# Patient Record
Sex: MALE | URBAN - NONMETROPOLITAN AREA
[De-identification: names, ages, dates, MRNs, and addresses within clinical notes are randomized per-mention and may not be internally consistent; named-entity substitution may affect disease eponyms.]

---

## 2017-10-02 ENCOUNTER — APPOINTMENT (RX ONLY)
Dept: URBAN - NONMETROPOLITAN AREA CLINIC 1 | Facility: CLINIC | Age: 57
Setting detail: DERMATOLOGY
End: 2017-10-02

## 2017-10-02 DIAGNOSIS — L81.4 OTHER MELANIN HYPERPIGMENTATION: ICD-10-CM

## 2017-10-02 DIAGNOSIS — L738 OTHER SPECIFIED DISEASES OF HAIR AND HAIR FOLLICLES: ICD-10-CM

## 2017-10-02 DIAGNOSIS — L73.9 FOLLICULAR DISORDER, UNSPECIFIED: ICD-10-CM

## 2017-10-02 DIAGNOSIS — L91.8 OTHER HYPERTROPHIC DISORDERS OF THE SKIN: ICD-10-CM

## 2017-10-02 DIAGNOSIS — D18.0 HEMANGIOMA: ICD-10-CM

## 2017-10-02 DIAGNOSIS — L85.3 XEROSIS CUTIS: ICD-10-CM

## 2017-10-02 DIAGNOSIS — L82.1 OTHER SEBORRHEIC KERATOSIS: ICD-10-CM

## 2017-10-02 DIAGNOSIS — L663 OTHER SPECIFIED DISEASES OF HAIR AND HAIR FOLLICLES: ICD-10-CM

## 2017-10-02 PROBLEM — L55.1 SUNBURN OF SECOND DEGREE: Status: ACTIVE | Noted: 2017-10-02

## 2017-10-02 PROBLEM — K21.9 GASTRO-ESOPHAGEAL REFLUX DISEASE WITHOUT ESOPHAGITIS: Status: ACTIVE | Noted: 2017-10-02

## 2017-10-02 PROBLEM — D18.01 HEMANGIOMA OF SKIN AND SUBCUTANEOUS TISSUE: Status: ACTIVE | Noted: 2017-10-02

## 2017-10-02 PROBLEM — K75.9 INFLAMMATORY LIVER DISEASE, UNSPECIFIED: Status: ACTIVE | Noted: 2017-10-02

## 2017-10-02 PROBLEM — L70.0 ACNE VULGARIS: Status: ACTIVE | Noted: 2017-10-02

## 2017-10-02 PROBLEM — J45.909 UNSPECIFIED ASTHMA, UNCOMPLICATED: Status: ACTIVE | Noted: 2017-10-02

## 2017-10-02 PROBLEM — Z85.828 PERSONAL HISTORY OF OTHER MALIGNANT NEOPLASM OF SKIN: Status: ACTIVE | Noted: 2017-10-02

## 2017-10-02 PROBLEM — L02.821 FURUNCLE OF HEAD [ANY PART, EXCEPT FACE]: Status: ACTIVE | Noted: 2017-10-02

## 2017-10-02 PROBLEM — F41.9 ANXIETY DISORDER, UNSPECIFIED: Status: ACTIVE | Noted: 2017-10-02

## 2017-10-02 PROCEDURE — ? COUNSELING

## 2017-10-02 PROCEDURE — ? PRESCRIPTION

## 2017-10-02 PROCEDURE — ? TREATMENT REGIMEN

## 2017-10-02 PROCEDURE — 99203 OFFICE O/P NEW LOW 30 MIN: CPT

## 2017-10-02 PROCEDURE — ? BENIGN DESTRUCTION COSMETIC MULTI

## 2017-10-02 RX ORDER — CLINDAMYCIN PHOSPHATE 10 MG/ML
SOLUTION TOPICAL
Qty: 1 | Refills: 3 | Status: ERX | COMMUNITY
Start: 2017-10-02

## 2017-10-02 RX ADMIN — CLINDAMYCIN PHOSPHATE: 10 SOLUTION TOPICAL at 21:21

## 2017-10-02 ASSESSMENT — LOCATION DETAILED DESCRIPTION DERM
LOCATION DETAILED: LEFT AXILLARY VAULT
LOCATION DETAILED: LEFT LATERAL ABDOMEN
LOCATION DETAILED: LEFT MEDIAL INFERIOR CHEST
LOCATION DETAILED: RIGHT AXILLARY VAULT
LOCATION DETAILED: RIGHT INFERIOR OCCIPITAL SCALP
LOCATION DETAILED: RIGHT ANTERIOR PROXIMAL THIGH

## 2017-10-02 ASSESSMENT — LOCATION SIMPLE DESCRIPTION DERM
LOCATION SIMPLE: ABDOMEN
LOCATION SIMPLE: LEFT AXILLARY VAULT
LOCATION SIMPLE: RIGHT THIGH
LOCATION SIMPLE: RIGHT AXILLARY VAULT
LOCATION SIMPLE: POSTERIOR SCALP
LOCATION SIMPLE: CHEST

## 2017-10-02 ASSESSMENT — LOCATION ZONE DERM
LOCATION ZONE: LEG
LOCATION ZONE: SCALP
LOCATION ZONE: AXILLAE
LOCATION ZONE: TRUNK

## 2017-10-02 NOTE — PROCEDURE: BENIGN DESTRUCTION COSMETIC MULTI
Detail Level: Generalized
Price (Use Numbers Only, No Special Characters Or $): 150
Total Number Of Lesions Treated: 10
Post-Care Instructions: I reviewed with the patient in detail post-care instructions. Patient is to wear sunprotection, and avoid picking at any of the treated lesions. Pt may apply Vaseline to crusted or scabbing areas.
Anesthesia Volume In Cc: 0
Consent: The patient's consent was obtained including but not limited to risks of crusting, scabbing, blistering, scarring, darker or lighter pigmentary change, recurrence, incomplete removal and infection.

## 2018-03-07 ENCOUNTER — RESOLUTE PROFESSIONAL BILLING HOSPITAL PROF FEE (OUTPATIENT)
Dept: HOSPITALIST | Facility: MEDICAL CENTER | Age: 58
End: 2018-03-07
Payer: COMMERCIAL

## 2018-03-07 ENCOUNTER — APPOINTMENT (OUTPATIENT)
Dept: RADIOLOGY | Facility: MEDICAL CENTER | Age: 58
End: 2018-03-07
Attending: EMERGENCY MEDICINE
Payer: COMMERCIAL

## 2018-03-07 ENCOUNTER — HOSPITAL ENCOUNTER (OUTPATIENT)
Facility: MEDICAL CENTER | Age: 58
End: 2018-03-08
Attending: EMERGENCY MEDICINE | Admitting: HOSPITALIST
Payer: COMMERCIAL

## 2018-03-07 DIAGNOSIS — R07.2 PRECORDIAL PAIN: ICD-10-CM

## 2018-03-07 DIAGNOSIS — I10 ESSENTIAL HYPERTENSION: ICD-10-CM

## 2018-03-07 PROBLEM — R07.9 PAIN IN THE CHEST: Status: ACTIVE | Noted: 2018-03-07

## 2018-03-07 PROBLEM — G47.33 OSA ON CPAP: Status: ACTIVE | Noted: 2018-03-07

## 2018-03-07 LAB
ALBUMIN SERPL BCP-MCNC: 4.3 G/DL (ref 3.2–4.9)
ALBUMIN/GLOB SERPL: 2.3 G/DL
ALP SERPL-CCNC: 54 U/L (ref 30–99)
ALT SERPL-CCNC: 52 U/L (ref 2–50)
ANION GAP SERPL CALC-SCNC: 11 MMOL/L (ref 0–11.9)
AST SERPL-CCNC: 24 U/L (ref 12–45)
BILIRUB SERPL-MCNC: 1.4 MG/DL (ref 0.1–1.5)
BNP SERPL-MCNC: <2 PG/ML (ref 0–100)
BUN SERPL-MCNC: 10 MG/DL (ref 8–22)
CALCIUM SERPL-MCNC: 9.4 MG/DL (ref 8.5–10.5)
CHLORIDE SERPL-SCNC: 108 MMOL/L (ref 96–112)
CO2 SERPL-SCNC: 19 MMOL/L (ref 20–33)
CREAT SERPL-MCNC: 0.67 MG/DL (ref 0.5–1.4)
EKG IMPRESSION: NORMAL
EKG IMPRESSION: NORMAL
GLOBULIN SER CALC-MCNC: 1.9 G/DL (ref 1.9–3.5)
GLUCOSE SERPL-MCNC: 92 MG/DL (ref 65–99)
LIPASE SERPL-CCNC: 49 U/L (ref 11–82)
POTASSIUM SERPL-SCNC: 3.9 MMOL/L (ref 3.6–5.5)
PROT SERPL-MCNC: 6.2 G/DL (ref 6–8.2)
SODIUM SERPL-SCNC: 138 MMOL/L (ref 135–145)
TROPONIN I SERPL-MCNC: <0.01 NG/ML (ref 0–0.04)
TROPONIN I SERPL-MCNC: <0.01 NG/ML (ref 0–0.04)

## 2018-03-07 PROCEDURE — 96372 THER/PROPH/DIAG INJ SC/IM: CPT

## 2018-03-07 PROCEDURE — 700102 HCHG RX REV CODE 250 W/ 637 OVERRIDE(OP): Performed by: HOSPITALIST

## 2018-03-07 PROCEDURE — 700102 HCHG RX REV CODE 250 W/ 637 OVERRIDE(OP): Performed by: EMERGENCY MEDICINE

## 2018-03-07 PROCEDURE — 83690 ASSAY OF LIPASE: CPT

## 2018-03-07 PROCEDURE — 93005 ELECTROCARDIOGRAM TRACING: CPT

## 2018-03-07 PROCEDURE — 83880 ASSAY OF NATRIURETIC PEPTIDE: CPT

## 2018-03-07 PROCEDURE — 700111 HCHG RX REV CODE 636 W/ 250 OVERRIDE (IP): Performed by: HOSPITALIST

## 2018-03-07 PROCEDURE — A9270 NON-COVERED ITEM OR SERVICE: HCPCS | Performed by: HOSPITALIST

## 2018-03-07 PROCEDURE — 93005 ELECTROCARDIOGRAM TRACING: CPT | Performed by: EMERGENCY MEDICINE

## 2018-03-07 PROCEDURE — G0378 HOSPITAL OBSERVATION PER HR: HCPCS

## 2018-03-07 PROCEDURE — 84484 ASSAY OF TROPONIN QUANT: CPT

## 2018-03-07 PROCEDURE — 71045 X-RAY EXAM CHEST 1 VIEW: CPT

## 2018-03-07 PROCEDURE — 36415 COLL VENOUS BLD VENIPUNCTURE: CPT

## 2018-03-07 PROCEDURE — 80053 COMPREHEN METABOLIC PANEL: CPT

## 2018-03-07 PROCEDURE — 93005 ELECTROCARDIOGRAM TRACING: CPT | Performed by: HOSPITALIST

## 2018-03-07 PROCEDURE — 99220 PR INITIAL OBSERVATION CARE,LEVL III: CPT | Performed by: HOSPITALIST

## 2018-03-07 PROCEDURE — 93010 ELECTROCARDIOGRAM REPORT: CPT | Performed by: INTERNAL MEDICINE

## 2018-03-07 PROCEDURE — A9270 NON-COVERED ITEM OR SERVICE: HCPCS | Performed by: EMERGENCY MEDICINE

## 2018-03-07 PROCEDURE — 84443 ASSAY THYROID STIM HORMONE: CPT

## 2018-03-07 PROCEDURE — 99285 EMERGENCY DEPT VISIT HI MDM: CPT

## 2018-03-07 RX ORDER — ASPIRIN 81 MG/1
324 TABLET, CHEWABLE ORAL DAILY
Status: DISCONTINUED | OUTPATIENT
Start: 2018-03-07 | End: 2018-03-08 | Stop reason: HOSPADM

## 2018-03-07 RX ORDER — BISACODYL 10 MG
10 SUPPOSITORY, RECTAL RECTAL
Status: DISCONTINUED | OUTPATIENT
Start: 2018-03-07 | End: 2018-03-08 | Stop reason: HOSPADM

## 2018-03-07 RX ORDER — HEPARIN SODIUM 5000 [USP'U]/ML
5000 INJECTION, SOLUTION INTRAVENOUS; SUBCUTANEOUS EVERY 8 HOURS
Status: DISCONTINUED | OUTPATIENT
Start: 2018-03-07 | End: 2018-03-08 | Stop reason: HOSPADM

## 2018-03-07 RX ORDER — LISINOPRIL 10 MG/1
5 TABLET ORAL
Status: DISCONTINUED | OUTPATIENT
Start: 2018-03-08 | End: 2018-03-08 | Stop reason: HOSPADM

## 2018-03-07 RX ORDER — MECOBALAMIN 5000 MCG
15 TABLET,DISINTEGRATING ORAL DAILY
Status: ON HOLD | COMMUNITY
End: 2018-03-08

## 2018-03-07 RX ORDER — ACETAMINOPHEN 325 MG/1
650 TABLET ORAL EVERY 6 HOURS PRN
Status: DISCONTINUED | OUTPATIENT
Start: 2018-03-07 | End: 2018-03-08 | Stop reason: HOSPADM

## 2018-03-07 RX ORDER — ASPIRIN 300 MG/1
300 SUPPOSITORY RECTAL DAILY
Status: DISCONTINUED | OUTPATIENT
Start: 2018-03-07 | End: 2018-03-08 | Stop reason: HOSPADM

## 2018-03-07 RX ORDER — PROMETHAZINE HYDROCHLORIDE 25 MG/1
12.5-25 TABLET ORAL EVERY 4 HOURS PRN
Status: DISCONTINUED | OUTPATIENT
Start: 2018-03-07 | End: 2018-03-08 | Stop reason: HOSPADM

## 2018-03-07 RX ORDER — LISINOPRIL 5 MG/1
5 TABLET ORAL DAILY
COMMUNITY

## 2018-03-07 RX ORDER — POLYETHYLENE GLYCOL 3350 17 G/17G
1 POWDER, FOR SOLUTION ORAL
Status: DISCONTINUED | OUTPATIENT
Start: 2018-03-07 | End: 2018-03-08 | Stop reason: HOSPADM

## 2018-03-07 RX ORDER — ASPIRIN 325 MG
325 TABLET ORAL DAILY
Status: DISCONTINUED | OUTPATIENT
Start: 2018-03-07 | End: 2018-03-08 | Stop reason: HOSPADM

## 2018-03-07 RX ORDER — HYDRALAZINE HYDROCHLORIDE 25 MG/1
25 TABLET, FILM COATED ORAL EVERY 6 HOURS PRN
Status: DISCONTINUED | OUTPATIENT
Start: 2018-03-07 | End: 2018-03-08 | Stop reason: HOSPADM

## 2018-03-07 RX ORDER — PROMETHAZINE HYDROCHLORIDE 12.5 MG/1
12.5-25 SUPPOSITORY RECTAL EVERY 4 HOURS PRN
Status: DISCONTINUED | OUTPATIENT
Start: 2018-03-07 | End: 2018-03-08 | Stop reason: HOSPADM

## 2018-03-07 RX ORDER — ONDANSETRON 4 MG/1
4 TABLET, ORALLY DISINTEGRATING ORAL EVERY 4 HOURS PRN
Status: DISCONTINUED | OUTPATIENT
Start: 2018-03-07 | End: 2018-03-08 | Stop reason: HOSPADM

## 2018-03-07 RX ORDER — ONDANSETRON 2 MG/ML
4 INJECTION INTRAMUSCULAR; INTRAVENOUS EVERY 4 HOURS PRN
Status: DISCONTINUED | OUTPATIENT
Start: 2018-03-07 | End: 2018-03-08 | Stop reason: HOSPADM

## 2018-03-07 RX ORDER — AMOXICILLIN 250 MG
2 CAPSULE ORAL 2 TIMES DAILY
Status: DISCONTINUED | OUTPATIENT
Start: 2018-03-07 | End: 2018-03-08 | Stop reason: HOSPADM

## 2018-03-07 RX ADMIN — ASPIRIN 324 MG: 81 TABLET, CHEWABLE ORAL at 20:22

## 2018-03-07 RX ADMIN — HEPARIN SODIUM 5000 UNITS: 5000 INJECTION, SOLUTION INTRAVENOUS; SUBCUTANEOUS at 21:42

## 2018-03-07 RX ADMIN — METOPROLOL TARTRATE 25 MG: 25 TABLET, FILM COATED ORAL at 19:07

## 2018-03-07 ASSESSMENT — ENCOUNTER SYMPTOMS
DIZZINESS: 0
FOCAL WEAKNESS: 0
EYE DISCHARGE: 0
DEPRESSION: 0
SPEECH CHANGE: 0
CLAUDICATION: 0
WHEEZING: 0
HEMOPTYSIS: 0
HEADACHES: 0
SHORTNESS OF BREATH: 0
EYE PAIN: 0
NAUSEA: 0
CONSTIPATION: 0
PALPITATIONS: 0
DIAPHORESIS: 0
WEAKNESS: 0
CHILLS: 0
ABDOMINAL PAIN: 0
NECK PAIN: 0
DIARRHEA: 0
VOMITING: 0
FEVER: 0
MYALGIAS: 0
SPUTUM PRODUCTION: 0
BACK PAIN: 0
BRUISES/BLEEDS EASILY: 0
SENSORY CHANGE: 0
LOSS OF CONSCIOUSNESS: 0
SORE THROAT: 0
COUGH: 0

## 2018-03-07 ASSESSMENT — PAIN SCALES - GENERAL
PAINLEVEL_OUTOF10: 3
PAINLEVEL_OUTOF10: 3
PAINLEVEL_OUTOF10: 0

## 2018-03-07 ASSESSMENT — LIFESTYLE VARIABLES
TOTAL SCORE: 0
EVER FELT BAD OR GUILTY ABOUT YOUR DRINKING: NO
EVER_SMOKED: NEVER
TOTAL SCORE: 0
AVERAGE NUMBER OF DAYS PER WEEK YOU HAVE A DRINK CONTAINING ALCOHOL: 1
TOTAL SCORE: 0
HAVE PEOPLE ANNOYED YOU BY CRITICIZING YOUR DRINKING: NO
SUBSTANCE_ABUSE: 0
CONSUMPTION TOTAL: NEGATIVE
HAVE YOU EVER FELT YOU SHOULD CUT DOWN ON YOUR DRINKING: NO
HOW MANY TIMES IN THE PAST YEAR HAVE YOU HAD 5 OR MORE DRINKS IN A DAY: 0
ALCOHOL_USE: YES
ON A TYPICAL DAY WHEN YOU DRINK ALCOHOL HOW MANY DRINKS DO YOU HAVE: 1
EVER HAD A DRINK FIRST THING IN THE MORNING TO STEADY YOUR NERVES TO GET RID OF A HANGOVER: NO

## 2018-03-07 ASSESSMENT — PATIENT HEALTH QUESTIONNAIRE - PHQ9
SUM OF ALL RESPONSES TO PHQ QUESTIONS 1-9: 0
1. LITTLE INTEREST OR PLEASURE IN DOING THINGS: NOT AT ALL
SUM OF ALL RESPONSES TO PHQ9 QUESTIONS 1 AND 2: 0
2. FEELING DOWN, DEPRESSED, IRRITABLE, OR HOPELESS: NOT AT ALL

## 2018-03-07 NOTE — ED TRIAGE NOTES
56 y/o male ambulatory to triage with c/o chest pain and left shoulder pain. Pt states he was seen at [facility] last week for similar symptoms and is scheduled for a stress test on 3/21/18. Pt states the pain is intermittent and dull.

## 2018-03-08 ENCOUNTER — APPOINTMENT (OUTPATIENT)
Dept: RADIOLOGY | Facility: MEDICAL CENTER | Age: 58
End: 2018-03-08
Attending: HOSPITALIST
Payer: COMMERCIAL

## 2018-03-08 VITALS
RESPIRATION RATE: 18 BRPM | HEART RATE: 100 BPM | TEMPERATURE: 98.4 F | OXYGEN SATURATION: 92 % | BODY MASS INDEX: 31.57 KG/M2 | SYSTOLIC BLOOD PRESSURE: 108 MMHG | DIASTOLIC BLOOD PRESSURE: 81 MMHG | HEIGHT: 68 IN | WEIGHT: 208.34 LBS

## 2018-03-08 PROBLEM — K21.9 GERD (GASTROESOPHAGEAL REFLUX DISEASE): Status: ACTIVE | Noted: 2018-03-08

## 2018-03-08 PROBLEM — R07.9 PAIN IN THE CHEST: Status: RESOLVED | Noted: 2018-03-07 | Resolved: 2018-03-08

## 2018-03-08 LAB
ANION GAP SERPL CALC-SCNC: 8 MMOL/L (ref 0–11.9)
BASOPHILS # BLD AUTO: 1.3 % (ref 0–1.8)
BASOPHILS # BLD: 0.11 K/UL (ref 0–0.12)
BUN SERPL-MCNC: 12 MG/DL (ref 8–22)
CALCIUM SERPL-MCNC: 9 MG/DL (ref 8.5–10.5)
CHLORIDE SERPL-SCNC: 107 MMOL/L (ref 96–112)
CHOLEST SERPL-MCNC: 159 MG/DL (ref 100–199)
CO2 SERPL-SCNC: 24 MMOL/L (ref 20–33)
CREAT SERPL-MCNC: 0.84 MG/DL (ref 0.5–1.4)
DEPRECATED D DIMER PPP IA-ACNC: <200 NG/ML(D-DU)
EKG IMPRESSION: NORMAL
EOSINOPHIL # BLD AUTO: 0.69 K/UL (ref 0–0.51)
EOSINOPHIL NFR BLD: 8.4 % (ref 0–6.9)
ERYTHROCYTE [DISTWIDTH] IN BLOOD BY AUTOMATED COUNT: 40.6 FL (ref 35.9–50)
GLUCOSE SERPL-MCNC: 120 MG/DL (ref 65–99)
HCT VFR BLD AUTO: 49.1 % (ref 42–52)
HDLC SERPL-MCNC: 42 MG/DL
HGB BLD-MCNC: 15.9 G/DL (ref 14–18)
IMM GRANULOCYTES # BLD AUTO: 0.02 K/UL (ref 0–0.11)
IMM GRANULOCYTES NFR BLD AUTO: 0.2 % (ref 0–0.9)
LDLC SERPL CALC-MCNC: 73 MG/DL
LYMPHOCYTES # BLD AUTO: 3.14 K/UL (ref 1–4.8)
LYMPHOCYTES NFR BLD: 38.1 % (ref 22–41)
MCH RBC QN AUTO: 28 PG (ref 27–33)
MCHC RBC AUTO-ENTMCNC: 32.4 G/DL (ref 33.7–35.3)
MCV RBC AUTO: 86.4 FL (ref 81.4–97.8)
MONOCYTES # BLD AUTO: 0.68 K/UL (ref 0–0.85)
MONOCYTES NFR BLD AUTO: 8.2 % (ref 0–13.4)
NEUTROPHILS # BLD AUTO: 3.61 K/UL (ref 1.82–7.42)
NEUTROPHILS NFR BLD: 43.8 % (ref 44–72)
NRBC # BLD AUTO: 0 K/UL
NRBC BLD-RTO: 0 /100 WBC
PLATELET # BLD AUTO: 234 K/UL (ref 164–446)
PMV BLD AUTO: 9.9 FL (ref 9–12.9)
POTASSIUM SERPL-SCNC: 3.5 MMOL/L (ref 3.6–5.5)
RBC # BLD AUTO: 5.68 M/UL (ref 4.7–6.1)
SODIUM SERPL-SCNC: 139 MMOL/L (ref 135–145)
TRIGL SERPL-MCNC: 218 MG/DL (ref 0–149)
TROPONIN I SERPL-MCNC: <0.01 NG/ML (ref 0–0.04)
TSH SERPL DL<=0.005 MIU/L-ACNC: 0.68 UIU/ML (ref 0.38–5.33)
WBC # BLD AUTO: 8.3 K/UL (ref 4.8–10.8)

## 2018-03-08 PROCEDURE — 85379 FIBRIN DEGRADATION QUANT: CPT

## 2018-03-08 PROCEDURE — A9270 NON-COVERED ITEM OR SERVICE: HCPCS | Performed by: HOSPITALIST

## 2018-03-08 PROCEDURE — 80061 LIPID PANEL: CPT

## 2018-03-08 PROCEDURE — G0378 HOSPITAL OBSERVATION PER HR: HCPCS

## 2018-03-08 PROCEDURE — 700111 HCHG RX REV CODE 636 W/ 250 OVERRIDE (IP): Performed by: HOSPITALIST

## 2018-03-08 PROCEDURE — 80048 BASIC METABOLIC PNL TOTAL CA: CPT

## 2018-03-08 PROCEDURE — 36415 COLL VENOUS BLD VENIPUNCTURE: CPT

## 2018-03-08 PROCEDURE — 85025 COMPLETE CBC W/AUTO DIFF WBC: CPT

## 2018-03-08 PROCEDURE — 99217 PR OBSERVATION CARE DISCHARGE: CPT | Performed by: HOSPITALIST

## 2018-03-08 PROCEDURE — 96372 THER/PROPH/DIAG INJ SC/IM: CPT

## 2018-03-08 PROCEDURE — 84484 ASSAY OF TROPONIN QUANT: CPT

## 2018-03-08 PROCEDURE — 700102 HCHG RX REV CODE 250 W/ 637 OVERRIDE(OP): Performed by: HOSPITALIST

## 2018-03-08 PROCEDURE — A9502 TC99M TETROFOSMIN: HCPCS

## 2018-03-08 RX ORDER — POTASSIUM CHLORIDE 20 MEQ/1
20 TABLET, EXTENDED RELEASE ORAL DAILY
Status: DISCONTINUED | OUTPATIENT
Start: 2018-03-08 | End: 2018-03-08 | Stop reason: HOSPADM

## 2018-03-08 RX ORDER — LANSOPRAZOLE 30 MG/1
15 CAPSULE, DELAYED RELEASE ORAL DAILY
Qty: 30 CAP | Refills: 3 | Status: SHIPPED | OUTPATIENT
Start: 2018-03-08

## 2018-03-08 RX ADMIN — ASPIRIN 325 MG: 325 TABLET ORAL at 08:08

## 2018-03-08 RX ADMIN — HEPARIN SODIUM 5000 UNITS: 5000 INJECTION, SOLUTION INTRAVENOUS; SUBCUTANEOUS at 05:53

## 2018-03-08 RX ADMIN — POTASSIUM CHLORIDE 20 MEQ: 1500 TABLET, EXTENDED RELEASE ORAL at 11:15

## 2018-03-08 ASSESSMENT — LIFESTYLE VARIABLES: EVER_SMOKED: NEVER

## 2018-03-08 ASSESSMENT — PAIN SCALES - GENERAL
PAINLEVEL_OUTOF10: 0
PAINLEVEL_OUTOF10: 0

## 2018-03-08 NOTE — ED PROVIDER NOTES
"ED Provider Note    CHIEF COMPLAINT  Chief Complaint   Patient presents with   • Chest Pain     x 10 days       HPI  Godwin Tanner is a 57 y.o. male who presents complaining of chest pain. Symptoms initially began in the end of February. He noticed this while he was snowblowing his driveway, particularly after he finished. He describes a dull ache in the center of his chest the left with some minimal radiation. As he describes as, he is clenching his fist over his sternum. He describes as \"like a potato in my chest.\" No sensation shortness of breath. The symptoms been off and on since that time. Today he noticed it again as he was trying to help get propane delivered to his house to the snow. He initially was seen at [facility], had laboratories and x-ray which were negative. This included a troponin and d-dimer. They had arranged him to have an outpatient stress that has gotten pushed back for another 2 weeks from today. He has had no leg pain or swelling. No recent trips or travel. He does take lisinopril for hypertension. He notes he has had quite elevated pulse rate, as high as the 130s with any kind of exertion and tends to stay high. When he wakes up in the morning to the 70s. He denies any fever or chills. No recent illness. There is no other complaint.    PAST MEDICAL HISTORY  Hypertension    FAMILY HISTORY  No family history on file.    SOCIAL HISTORY  He is a . No tobacco.    SURGICAL HISTORY  No past surgical history on file.    CURRENT MEDICATIONS    I have reviewed the nurses notes and/or the list brought with the patient.    ALLERGIES  Allergies   Allergen Reactions   • Ceclor [Cefaclor]    • Thimerosal        REVIEW OF SYSTEMS  See HPI for further details. Review of systems as above, otherwise all other systems are negative.     PHYSICAL EXAM  VITAL SIGNS: /93   Pulse 97   Temp 36.7 °C (98 °F) (Temporal)   Resp 16   Ht 1.727 m (5' 8\")   Wt 96.3 kg (212 lb 4.9 oz)   " SpO2 98%   BMI 32.28 kg/m²   Constitutional: Well appearing patient in no acute distress.  Not toxic, nor ill in appearance.  HENT: Mucus membranes moist.  Oropharynx is clear.  Eyes: Pupils equally round.  No scleral icterus.   Neck: Full nontender range of motion.  Lymphatic: No cervical lymphadenopathy noted.   Cardiovascular: Regular heart rate and rhythm.  No murmurs, rubs, nor gallop appreciated.   Thorax & Lungs: Chest is nontender.  Lungs are clear to auscultation with good air movement bilaterally.  No wheeze, rhonchi, nor rales.   Abdomen: Soft, with no tenderness, rebound nor guarding.  No mass, pulsatile mass, nor hepatosplenomegaly appreciated.  Skin: No purpura nor petechia noted.  Extremities/Musculoskeletal: No sign of trauma.  Calves are nontender with no cords nor edema.  No Rhea's sign.  Pulses are intact all around.   Neurologic: Alert & oriented.  Strength and sensation is intact all around.  Gait is normal.  Psychiatric: Normal affect appropriate for the clinical situation.    EKG  I interpreted this EKG myself.  This is a 12-lead study.  The rhythm is sinus with a normal rate.  There are no ST segment nor T wave abnormalities.  Interpretation: No ST segment elevation myocardial infarction..    LABS  Labs Reviewed   COMP METABOLIC PANEL - Abnormal; Notable for the following:        Result Value    Co2 19 (*)     ALT(SGPT) 52 (*)     All other components within normal limits    Narrative:     Indicate which anticoagulants the patient is on:->UNKNOWN   TROPONIN    Narrative:     Indicate which anticoagulants the patient is on:->UNKNOWN   BTYPE NATRIURETIC PEPTIDE    Narrative:     Indicate which anticoagulants the patient is on:->UNKNOWN   LIPASE    Narrative:     Indicate which anticoagulants the patient is on:->UNKNOWN   ESTIMATED GFR    Narrative:     Indicate which anticoagulants the patient is on:->UNKNOWN   PROTHROMBIN TIME   APTT   CBC WITH DIFFERENTIAL         RADIOLOGY/PROCEDURES  I  have reviewed the patient's film interpretations myself, and they are read out by the radiologist as:   DX-CHEST-LIMITED (1 VIEW)   Final Result      Minimal linear atelectasis or fibrosis in the left lower lobe. Otherwise clear chest.        .    MEDICAL RECORD  I have reviewed patient's medical record and pertinent results are listed above.    COURSE & MEDICAL DECISION MAKING  I have reviewed any medical record information, laboratory studies and radiographic results as noted above.  This patient presents with a history of exertional chest discomfort. The quality of which is extremely concerning for cardiac etiology. The patient does have some hypertension here, his pulse rate is somewhat elevated, we'll go ahead and give him metoprolol. EKG shows no evidence of acute ST segment elevation myocardial infarction. His 1st troponin here is negative. Chest x-ray shows no evidence of infiltrate, normal cardiac shadow. I certainly think he needs to be admitted to hospital for expeditious stress testing, and further intervention based upon this. I discussed the patient's case with Dr. Gonzalez, hospitalist, who will be admitting. Of note, the patient is taken a full size aspirin already today.    FINAL IMPRESSION  1. Precordial pain    2. Essential hypertension           This dictation was created using voice recognition software.    Electronically signed by: Jakob Jacobson, 3/7/2018 6:37 PM

## 2018-03-08 NOTE — DISCHARGE SUMMARY
"CHIEF COMPLAINT ON ADMISSION  Chief Complaint   Patient presents with   • Chest Pain     x 10 days       CODE STATUS  Full Code    HPI & HOSPITAL COURSE  This is a 57 y.o. male with past medical history significant for VEENA, hypertension and GERD here with chest pain. He reported the symptoms initially began at the end of February while he was snow blowing his driveway. He described the pain as a dull ache in the center of his chest to the left with some minimal radiation. As he describes the pain he clenches his fist over his sternum and states it feels \"like a potato in my chest\". He was initially seen at [facility] where his troponin, d-dimer, twelve-lead EKG and x-rays were negative. They had arranged for him to have outpatient stress test, however it was pushed back to more weeks, therefore he came here for evaluation and treatment. He wears a CPAP machine at night for his VEENA however has not worn it the last few nights due to malfunctioning of the mask. He states the mask pushes too much air into his lungs.  Hospital workup included CBC and BMP that were essentially unremarkable. Troponin less than 0.01×2 and d-dimer less than 200. Lipid panel showed total cholesterol 159, triglycerides 218, HDL 42 and LDL 73. Chest x-ray showed normal heart silhouette with minimal linear atelectasis or fibrosis in the left lower lobe. Twelve-lead EKG showed sinus rhythm rate of 70 with no ST elevations or depressions. Remained telemetry showed no events of arrhythmias, tachycardias, bradycardias or blocks. He underwent nuclear medicine stress testing this morning which showed no evidence of significant jeopardized viable myocardium or prior myocardial infarction.  Currently he states that all this pain has completely resolved. He denies any chest pain, shortness of breath, dizziness, palpitations or weakness. He is tolerating a diet without any nausea or vomiting. He is questioning whether this event might be due to his " GERD. He states he recently decreased his Prevacid dose to 15 mg by mouth daily. This will be increased to 30 mg by mouth daily at discharge. He is instructed to call 911 or return to the emergency department with any further emergency.    Therefore, he is discharged in good and stable condition with close outpatient follow-up.    DISCHARGE PROBLEM LIST  Principal Problem (Resolved):    Pain in the chest POA: Yes  Active Problems:    Essential hypertension POA: Yes    VEENA on CPAP POA: Yes    FOLLOW UP  He reported he will call his PCP and make a follow-up appointment.       MEDICATIONS ON DISCHARGE     Medication List      CHANGE how you take these medications      Instructions   lansoprazole 30 MG Cpdr  What changed:  · medication strength  · how much to take  Commonly known as:  PREVACID   Take 1 Cap by mouth every day.  Dose:  15 mg        CONTINUE taking these medications      Instructions   aspirin  MG Tbec  Commonly known as:  ECOTRIN   Take 325 mg by mouth every day.  Dose:  325 mg     lisinopril 5 MG Tabs  Commonly known as:  PRINIVIL   Take 5 mg by mouth every day.  Dose:  5 mg          DIET  Orders Placed This Encounter   Procedures   • Protocol 1312 Diet Order: Reg, No Decaf, No Caffeine - Cardiac Stress Test Treadmill with Isotope     Standing Status:   Standing     Number of Occurrences:   1     Order Specific Question:   Diet:     Answer:   Regular [1]     Order Specific Question:   Miscellaneous modifications:     Answer:   No Decaf, No Caffeine(for test) [11]     Comments:   Protocol 1312 No caffeine for 12 hours prior to exam (decaf, coffee, cola, tea, chocolate)       ACTIVITY  As tolerated.  Weight bearing as tolerated      CONSULTATIONS  NA    PROCEDURES  NA    LABORATORY  Lab Results   Component Value Date/Time    SODIUM 139 03/08/2018 04:06 AM    POTASSIUM 3.5 (L) 03/08/2018 04:06 AM    CHLORIDE 107 03/08/2018 04:06 AM    CO2 24 03/08/2018 04:06 AM    GLUCOSE 120 (H) 03/08/2018 04:06 AM     BUN 12 03/08/2018 04:06 AM    CREATININE 0.84 03/08/2018 04:06 AM        Lab Results   Component Value Date/Time    WBC 8.3 03/08/2018 04:06 AM    HEMOGLOBIN 15.9 03/08/2018 04:06 AM    HEMATOCRIT 49.1 03/08/2018 04:06 AM    PLATELETCT 234 03/08/2018 04:06 AM        Total time of the discharge process 40 minutes   ROD Garcia.

## 2018-03-08 NOTE — PROGRESS NOTES
A/o,assessment completed,poc discussed,verbalized understanding,tolerating po well,cp 2/10,denies sob,dizziness,call button within reach,will continue to monitor.

## 2018-03-08 NOTE — PROGRESS NOTES
Discharge instructions, medications and follow-up reviewed with pt, pt verbalized understanding and denies questions. Discharge paperwork given to pt. PIV removed, TeleBox removed, armband removed. Pt walk to ER [facility] with hospital escort.

## 2018-03-08 NOTE — DISCHARGE INSTRUCTIONS
Discharge Instructions    Discharged to home by car with relative. Discharged via wheelchair, hospital escort: Yes.  Special equipment needed: Not Applicable    Be sure to schedule a follow-up appointment with your primary care doctor or any specialists as instructed.     Discharge Plan:   Diet Plan: Discussed  Activity Level: Discussed  Confirmed Follow up Appointment: Patient to Call and Schedule Appointment  Confirmed Symptoms Management: Discussed  Medication Reconciliation Updated: Yes  Influenza Vaccine Indication: Patient Refuses    I understand that a diet low in cholesterol, fat, and sodium is recommended for good health. Unless I have been given specific instructions below for another diet, I accept this instruction as my diet prescription.   Other diet: Regular    Special Instructions: None    · Is patient discharged on Warfarin / Coumadin?   No     Depression / Suicide Risk    As you are discharged from this [facility] facility, it is important to learn how to keep safe from harming yourself.    Recognize the warning signs:  · Abrupt changes in personality, positive or negative- including increase in energy   · Giving away possessions  · Change in eating patterns- significant weight changes-  positive or negative  · Change in sleeping patterns- unable to sleep or sleeping all the time   · Unwillingness or inability to communicate  · Depression  · Unusual sadness, discouragement and loneliness  · Talk of wanting to die  · Neglect of personal appearance   · Rebelliousness- reckless behavior  · Withdrawal from people/activities they love  · Confusion- inability to concentrate     If you or a loved one observes any of these behaviors or has concerns about self-harm, here's what you can do:  · Talk about it- your feelings and reasons for harming yourself  · Remove any means that you might use to hurt yourself (examples: pills, rope, extension cords, firearm)  · Get professional help from the community  (Mental Health, Substance Abuse, psychological counseling)  · Do not be alone:Call your Safe Contact- someone whom you trust who will be there for you.  · Call your local CRISIS HOTLINE 511-8879 or 791-799-1736  · Call your local Children's Mobile Crisis Response Team [place] (282) 085-8731 or www.[employer]  · Call the toll free National Suicide Prevention Hotlines   · National Suicide Prevention Lifeline 515-297-WNZT (2516)  · National Hope Line Network 800-SUICIDE (960-0069)

## 2018-03-08 NOTE — ASSESSMENT & PLAN NOTE
Currently not working properly, off x last 2 days.  Feels like air pushing into chest?  CXR wnl.

## 2018-03-08 NOTE — ASSESSMENT & PLAN NOTE
ekg sr 98 t wave inversion lead 3 only.  No other changes.  No prior ekgs to compare.  9 days of constant chest pain worsened with activity.   Yet troponin .01, no significant EKG changes  Telemetry monitor, repeat trop, ASA, NM treadmill test in a.m.  Lipid profile, tsh.  Blood pressure control with lisinopril.  Received metoprolol on admission.  Will need to hold bb for treatmill stress testing.  D-dimer.  cxr wnl.

## 2018-03-08 NOTE — H&P
Hospital Medicine History and Physical    Date of Service  3/7/2018    Chief Complaint  Chief Complaint   Patient presents with   • Chest Pain     x 10 days       History of Presenting Illness  57 y.o. male who presented 3/7/2018 with 9 day history of anterior chest pain that is constant, but increases with activity.  He first noticed the pain on 2/26/28 after snow blowing his driveway in [place].  He went to [facility] ER, records obtained by ERP and reviewed.  His troponin, d-dimer were negative, EKG with ST, no changes on read (actual tracing was not sent).  He was seen by cardiology who recommended outpatient stress testing, but unfortunately the treadmill was not functioning.  He therefore, never had a stress test.  Today he states he was helping the propane tech haul the propane hose through 5 feet of snow to refill the propane tank at his home when he developed increase in dull ache center of chest.  The dull ache never subsides but worsens after the activity is completed.  He states it radiates to his back as well as to the left shoulder.  No diaphoresis or SOB.  The patient states he wears a cpap machine qhs for VEENA.  However, the last 2 nights he stopped wearing it due to malfunctioning of the mask, states it is blowing too much air into his lungs.  Patient lives alone, works for [employer].  Never smoked.  Lives part time in [place].    Primary Care Physician  No primary care provider on file.    Consultants  none    Code Status  Full code    Review of Systems  Review of Systems   Constitutional: Negative for chills, diaphoresis, fever and malaise/fatigue.   HENT: Negative for congestion and sore throat.    Eyes: Negative for pain and discharge.   Respiratory: Negative for cough, hemoptysis, sputum production, shortness of breath and wheezing.    Cardiovascular: Positive for chest pain. Negative for palpitations, claudication and leg swelling.   Gastrointestinal: Negative for abdominal pain,  constipation, diarrhea, melena, nausea and vomiting.   Genitourinary: Negative for dysuria, frequency and urgency.   Musculoskeletal: Negative for back pain, joint pain, myalgias and neck pain.   Skin: Negative for itching and rash.   Neurological: Negative for dizziness, sensory change, speech change, focal weakness, loss of consciousness, weakness and headaches.   Endo/Heme/Allergies: Does not bruise/bleed easily.   Psychiatric/Behavioral: Negative for depression, substance abuse and suicidal ideas.        Past Medical History  Past Medical History:   Diagnosis Date   • GERD (gastroesophageal reflux disease)    • HTN (hypertension)    • VEENA on CPAP        Surgical History  Past Surgical History:   Procedure Laterality Date   • APPENDECTOMY LAPAROSCOPIC  2016       Medications  Lisinopril 5mg po daily    Family History  Family History   Problem Relation Age of Onset   • Hypertension Father    • Stroke Paternal Grandfather      59 yo       Social History  Social History   Substance Use Topics   • Smoking status: Never Smoker   • Smokeless tobacco: Not on file   • Alcohol use 0.6 oz/week     1 Glasses of wine per week      Comment: occasional only       Allergies  Allergies   Allergen Reactions   • Ceclor [Cefaclor]    • Thimerosal         Physical Exam  Laboratory   Hemodynamics  Temp (24hrs), Av.7 °C (98 °F), Min:36.7 °C (98 °F), Max:36.7 °C (98 °F)   Temperature: 36.7 °C (98 °F)  Pulse  Av  Min: 97  Max: 97    Blood Pressure: 150/93, NIBP: 136/92      Respiratory      Respiration: 16, Pulse Oximetry: 98 %             Physical Exam   Constitutional: He is oriented to person, place, and time. He appears well-developed and well-nourished. No distress.   HENT:   Head: Normocephalic and atraumatic.   Mouth/Throat: Oropharynx is clear and moist. No oropharyngeal exudate.   Eyes: Conjunctivae and EOM are normal. Pupils are equal, round, and reactive to light. Right eye exhibits no discharge. Left eye exhibits no  discharge. No scleral icterus.   Neck: Normal range of motion. Neck supple. No JVD present. No tracheal deviation present. No thyromegaly present.   Cardiovascular: Normal rate, regular rhythm and normal heart sounds.  Exam reveals no gallop and no friction rub.    No murmur heard.  Pulmonary/Chest: Effort normal and breath sounds normal. No respiratory distress. He has no wheezes. He has no rales. He exhibits no tenderness.   No reproducible chest wall pain with palpation or movement.   Abdominal: Soft. Bowel sounds are normal. He exhibits no distension and no mass. There is no tenderness. There is no rebound and no guarding.   Musculoskeletal: Normal range of motion. He exhibits no edema or tenderness.   Lymphadenopathy:     He has no cervical adenopathy.   Neurological: He is alert and oriented to person, place, and time. No cranial nerve deficit. He exhibits normal muscle tone.   Skin: Skin is warm and dry. No rash noted. He is not diaphoretic. No erythema.   Psychiatric: He has a normal mood and affect. His behavior is normal. Judgment and thought content normal.   Nursing note and vitals reviewed.          Recent Labs      03/07/18   1556   SODIUM  138   POTASSIUM  3.9   CHLORIDE  108   CO2  19*   GLUCOSE  92   BUN  10   CREATININE  0.67   CALCIUM  9.4     Recent Labs      03/07/18   1556   ALTSGPT  52*   ASTSGOT  24   ALKPHOSPHAT  54   TBILIRUBIN  1.4   LIPASE  49   GLUCOSE  92         Recent Labs      03/07/18   1556   BNPBTYPENAT  <2         Lab Results   Component Value Date    TROPONINI <0.01 03/07/2018     Urinalysis:  No results found for: SPECGRAVITY, GLUCOSEUR, KETONES, NITRITE, WBCURINE, RBCURINE, BACTERIA, EPITHELCELL     Imaging  CXR atelectasis   Assessment/Plan     I anticipate this patient is appropriate for observation status at this time.    * Pain in the chest- (present on admission)   Assessment & Plan    ekg sr 98 t wave inversion lead 3 only.  No other changes.  No prior ekgs to  compare.  9 days of constant chest pain worsened with activity.   Yet troponin .01, no significant EKG changes  Telemetry monitor, repeat trop, ASA, NM treadmill test in a.m.  Lipid profile, tsh.  Blood pressure control with lisinopril.  Received metoprolol on admission.  Will need to hold bb for treatmill stress testing.  D-dimer.  cxr wnl.        VEENA on CPAP- (present on admission)   Assessment & Plan    Currently not working properly, off x last 2 days.  Feels like air pushing into chest?  CXR wnl.          Essential hypertension- (present on admission)   Assessment & Plan    Start home lisinopril 5mg po daily.  Monitor.              VTE prophylaxis: heparin.

## 2024-05-28 NOTE — DISCHARGE PLANNING
Patient 's next of kin is 20 yr old son Joaquín Gómez ([phone]) who is in college in [place]. Patient would prefer if something were to happen to him that his son not be the first one called. He voices that he would prefer sister, Chata Gómez ([phone]) called first.    Patient normally uses a CPAP but has not been using as he informs me it is malfunctioning. Advised him to call [employer] and let them know this to see if it can be fixed or replaced. He verbalizes understanding.     Care Transition Team Assessment    Information Source  Orientation : Oriented x 4  Information Given By: Patient  Who is responsible for making decisions for patient? : Patient    Readmission Evaluation  Is this a readmission?: No    Elopement Risk  Legal Hold: No    Interdisciplinary Discharge Planning  Does Admitting Nurse Feel This Could be a Complex Discharge?: No  Primary Care Physician: JIMY Romano in [place]  Lives with - Patient's Self Care Capacity: Alone and Able to Care For Self  Patient or legal guardian wants to designate a caregiver (see row info): No  Support Systems: None (son lives in [place])  Housing / Facility: [address] [place] [address]  Do You Take your Prescribed Medications Regularly: Yes  Able to Return to Previous ADL's: Yes  Mobility Issues: No  Prior Services: None  Patient Expects to be Discharged to:: home  Assistance Needed: No  Durable Medical Equipment: Other - Specify (CPAP)  DME Provider / Phone: [facility]    Discharge Preparedness  What is your plan after discharge?:  (home)  What are your discharge supports?: Other (comment) (none)  Prior Functional Level: Drives Self, Independent with Activities of Daily Living, Independent with Medication Management  Difficulity with ADLs: None    Functional Assesment  Prior Functional Level: Drives Self, Independent with Activities of Daily Living, Independent with Medication Management    Finances  Financial Barriers to Discharge: No  Prescription Coverage:  Yes    Vision / Hearing Impairment  Vision Impairment : Yes  Right Eye Vision: Wears Glasses  Left Eye Vision: Wears Glasses  Hearing Impairment : No    Values / Beliefs / Concerns  Values / Beliefs Concerns : No    Advance Directive  Advance Directive?: None  Advance Directive offered?: AD Booklet given    Domestic Abuse  Have you ever been the victim of abuse or violence?: No  Physical Abuse or Sexual Abuse: No  Verbal Abuse or Emotional Abuse: No    Psychological Assessment  History of Substance Abuse: None  History of Psychiatric Problems: No    Discharge Risks or Barriers  Discharge risks or barriers?: Lives alone, no community support  Patient risk factors: Lack of outside supports    Anticipated Discharge Information  Anticipated discharge disposition: Home  Discharge Address: [address] [place] [id number]  Discharge Contact Phone Number: [phone]         DISCHARGE